# Patient Record
Sex: FEMALE | Race: WHITE | Employment: FULL TIME | URBAN - METROPOLITAN AREA
[De-identification: names, ages, dates, MRNs, and addresses within clinical notes are randomized per-mention and may not be internally consistent; named-entity substitution may affect disease eponyms.]

---

## 2019-06-27 PROCEDURE — 82607 VITAMIN B-12: CPT | Performed by: FAMILY MEDICINE

## 2019-10-09 PROCEDURE — 83883 ASSAY NEPHELOMETRY NOT SPEC: CPT | Performed by: OTHER

## 2019-10-09 PROCEDURE — 86618 LYME DISEASE ANTIBODY: CPT | Performed by: OTHER

## 2019-10-09 PROCEDURE — 86334 IMMUNOFIX E-PHORESIS SERUM: CPT | Performed by: OTHER

## 2019-10-09 PROCEDURE — 86617 LYME DISEASE ANTIBODY: CPT | Performed by: OTHER

## 2019-10-09 PROCEDURE — 84165 PROTEIN E-PHORESIS SERUM: CPT | Performed by: OTHER

## 2019-10-09 PROCEDURE — 82164 ANGIOTENSIN I ENZYME TEST: CPT | Performed by: OTHER

## 2020-01-29 PROCEDURE — 88173 CYTOPATH EVAL FNA REPORT: CPT | Performed by: SURGERY

## 2020-06-08 PROBLEM — E78.5 DYSLIPIDEMIA: Status: ACTIVE | Noted: 2020-06-08

## 2025-05-14 RX ORDER — HYDROCHLOROTHIAZIDE 25 MG/1
25 TABLET ORAL DAILY
COMMUNITY
Start: 2025-02-13

## 2025-05-14 RX ORDER — LABETALOL 100 MG/1
150 TABLET, FILM COATED ORAL 2 TIMES DAILY
COMMUNITY
Start: 2022-12-12

## 2025-05-16 ENCOUNTER — HOSPITAL ENCOUNTER (OUTPATIENT)
Facility: HOSPITAL | Age: 70
Setting detail: HOSPITAL OUTPATIENT SURGERY
Discharge: HOME | End: 2025-05-16
Attending: ORTHOPAEDIC SURGERY | Admitting: ORTHOPAEDIC SURGERY
Payer: COMMERCIAL

## 2025-05-16 ENCOUNTER — ANESTHESIA (OUTPATIENT)
Dept: SURGERY | Facility: HOSPITAL | Age: 70
End: 2025-05-16
Payer: COMMERCIAL

## 2025-05-16 ENCOUNTER — ANESTHESIA EVENT (OUTPATIENT)
Dept: SURGERY | Facility: HOSPITAL | Age: 70
End: 2025-05-16
Payer: COMMERCIAL

## 2025-05-16 VITALS
BODY MASS INDEX: 24.1 KG/M2 | HEIGHT: 63 IN | DIASTOLIC BLOOD PRESSURE: 67 MMHG | OXYGEN SATURATION: 96 % | WEIGHT: 136 LBS | HEART RATE: 66 BPM | RESPIRATION RATE: 16 BRPM | SYSTOLIC BLOOD PRESSURE: 132 MMHG | TEMPERATURE: 98 F

## 2025-05-16 PROCEDURE — 88311 DECALCIFY TISSUE: CPT | Performed by: ORTHOPAEDIC SURGERY

## 2025-05-16 PROCEDURE — 88305 TISSUE EXAM BY PATHOLOGIST: CPT | Performed by: ORTHOPAEDIC SURGERY

## 2025-05-16 DEVICE — UNIVERS REVERS SUTURE CUP, 33 (+2 RIGHT)
Type: IMPLANTABLE DEVICE | Site: SHOULDER | Status: FUNCTIONAL
Brand: ARTHREX®

## 2025-05-16 DEVICE — IMPLANTABLE DEVICE: Type: IMPLANTABLE DEVICE | Site: SHOULDER | Status: FUNCTIONAL

## 2025-05-16 DEVICE — 33 +4 LAT/24 GLENOSPHERE
Type: IMPLANTABLE DEVICE | Site: SHOULDER | Status: FUNCTIONAL
Brand: ARTHREX®

## 2025-05-16 DEVICE — UNIVERS REVERS HUMERAL STEM, SIZE 7
Type: IMPLANTABLE DEVICE | Site: SHOULDER | Status: FUNCTIONAL
Brand: ARTHREX®

## 2025-05-16 DEVICE — IMPLANTABLE DEVICE: Type: IMPLANTABLE DEVICE | Site: SHOULDER

## 2025-05-16 DEVICE — HUMERAL INSERT XS 33+3MM
Type: IMPLANTABLE DEVICE | Site: SHOULDER | Status: FUNCTIONAL
Brand: ARTHREX®

## 2025-05-16 RX ORDER — HYDROMORPHONE HYDROCHLORIDE 1 MG/ML
0.6 INJECTION, SOLUTION INTRAMUSCULAR; INTRAVENOUS; SUBCUTANEOUS EVERY 5 MIN PRN
Status: DISCONTINUED | OUTPATIENT
Start: 2025-05-16 | End: 2025-05-16

## 2025-05-16 RX ORDER — ROPIVACAINE HYDROCHLORIDE 5 MG/ML
INJECTION, SOLUTION EPIDURAL; INFILTRATION; PERINEURAL AS NEEDED
Status: DISCONTINUED | OUTPATIENT
Start: 2025-05-16 | End: 2025-05-16 | Stop reason: SURG

## 2025-05-16 RX ORDER — NALOXONE HYDROCHLORIDE 0.4 MG/ML
0.08 INJECTION, SOLUTION INTRAMUSCULAR; INTRAVENOUS; SUBCUTANEOUS AS NEEDED
Status: DISCONTINUED | OUTPATIENT
Start: 2025-05-16 | End: 2025-05-16

## 2025-05-16 RX ORDER — ONDANSETRON 2 MG/ML
INJECTION INTRAMUSCULAR; INTRAVENOUS AS NEEDED
Status: DISCONTINUED | OUTPATIENT
Start: 2025-05-16 | End: 2025-05-16 | Stop reason: SURG

## 2025-05-16 RX ORDER — VANCOMYCIN HYDROCHLORIDE 1 G/20ML
INJECTION, POWDER, LYOPHILIZED, FOR SOLUTION INTRAVENOUS AS NEEDED
Status: DISCONTINUED | OUTPATIENT
Start: 2025-05-16 | End: 2025-05-16 | Stop reason: HOSPADM

## 2025-05-16 RX ORDER — ACETAMINOPHEN 500 MG
1000 TABLET ORAL ONCE
Status: COMPLETED | OUTPATIENT
Start: 2025-05-16 | End: 2025-05-16

## 2025-05-16 RX ORDER — METOCLOPRAMIDE HYDROCHLORIDE 5 MG/ML
10 INJECTION INTRAMUSCULAR; INTRAVENOUS EVERY 8 HOURS PRN
Status: DISCONTINUED | OUTPATIENT
Start: 2025-05-16 | End: 2025-05-16

## 2025-05-16 RX ORDER — METOPROLOL TARTRATE 25 MG/1
25 TABLET, FILM COATED ORAL ONCE AS NEEDED
Status: DISCONTINUED | OUTPATIENT
Start: 2025-05-16 | End: 2025-05-16 | Stop reason: HOSPADM

## 2025-05-16 RX ORDER — SODIUM CHLORIDE, SODIUM LACTATE, POTASSIUM CHLORIDE, CALCIUM CHLORIDE 600; 310; 30; 20 MG/100ML; MG/100ML; MG/100ML; MG/100ML
INJECTION, SOLUTION INTRAVENOUS CONTINUOUS
Status: DISCONTINUED | OUTPATIENT
Start: 2025-05-16 | End: 2025-05-16

## 2025-05-16 RX ORDER — ONDANSETRON 2 MG/ML
4 INJECTION INTRAMUSCULAR; INTRAVENOUS EVERY 6 HOURS PRN
Status: DISCONTINUED | OUTPATIENT
Start: 2025-05-16 | End: 2025-05-16

## 2025-05-16 RX ORDER — HYDROMORPHONE HYDROCHLORIDE 1 MG/ML
0.2 INJECTION, SOLUTION INTRAMUSCULAR; INTRAVENOUS; SUBCUTANEOUS EVERY 5 MIN PRN
Status: DISCONTINUED | OUTPATIENT
Start: 2025-05-16 | End: 2025-05-16

## 2025-05-16 RX ORDER — ROCURONIUM BROMIDE 10 MG/ML
INJECTION, SOLUTION INTRAVENOUS AS NEEDED
Status: DISCONTINUED | OUTPATIENT
Start: 2025-05-16 | End: 2025-05-16 | Stop reason: SURG

## 2025-05-16 RX ORDER — LIDOCAINE HYDROCHLORIDE 10 MG/ML
INJECTION, SOLUTION EPIDURAL; INFILTRATION; INTRACAUDAL; PERINEURAL AS NEEDED
Status: DISCONTINUED | OUTPATIENT
Start: 2025-05-16 | End: 2025-05-16 | Stop reason: SURG

## 2025-05-16 RX ORDER — MORPHINE SULFATE 4 MG/ML
2 INJECTION, SOLUTION INTRAMUSCULAR; INTRAVENOUS EVERY 10 MIN PRN
Status: DISCONTINUED | OUTPATIENT
Start: 2025-05-16 | End: 2025-05-16

## 2025-05-16 RX ORDER — DEXAMETHASONE SODIUM PHOSPHATE 4 MG/ML
VIAL (ML) INJECTION AS NEEDED
Status: DISCONTINUED | OUTPATIENT
Start: 2025-05-16 | End: 2025-05-16 | Stop reason: SURG

## 2025-05-16 RX ORDER — MIDAZOLAM HYDROCHLORIDE 1 MG/ML
INJECTION INTRAMUSCULAR; INTRAVENOUS AS NEEDED
Status: DISCONTINUED | OUTPATIENT
Start: 2025-05-16 | End: 2025-05-16 | Stop reason: SURG

## 2025-05-16 RX ORDER — DEXAMETHASONE SODIUM PHOSPHATE 10 MG/ML
INJECTION, SOLUTION INTRAMUSCULAR; INTRAVENOUS AS NEEDED
Status: DISCONTINUED | OUTPATIENT
Start: 2025-05-16 | End: 2025-05-16 | Stop reason: SURG

## 2025-05-16 RX ORDER — MORPHINE SULFATE 4 MG/ML
4 INJECTION, SOLUTION INTRAMUSCULAR; INTRAVENOUS EVERY 10 MIN PRN
Status: DISCONTINUED | OUTPATIENT
Start: 2025-05-16 | End: 2025-05-16

## 2025-05-16 RX ORDER — HYDROMORPHONE HYDROCHLORIDE 1 MG/ML
0.4 INJECTION, SOLUTION INTRAMUSCULAR; INTRAVENOUS; SUBCUTANEOUS EVERY 5 MIN PRN
Status: DISCONTINUED | OUTPATIENT
Start: 2025-05-16 | End: 2025-05-16

## 2025-05-16 RX ORDER — MORPHINE SULFATE 10 MG/ML
6 INJECTION, SOLUTION INTRAMUSCULAR; INTRAVENOUS EVERY 10 MIN PRN
Status: DISCONTINUED | OUTPATIENT
Start: 2025-05-16 | End: 2025-05-16

## 2025-05-16 RX ORDER — TRANEXAMIC ACID 10 MG/ML
INJECTION, SOLUTION INTRAVENOUS AS NEEDED
Status: DISCONTINUED | OUTPATIENT
Start: 2025-05-16 | End: 2025-05-16 | Stop reason: SURG

## 2025-05-16 RX ORDER — PHENYLEPHRINE HCL 10 MG/ML
VIAL (ML) INJECTION AS NEEDED
Status: DISCONTINUED | OUTPATIENT
Start: 2025-05-16 | End: 2025-05-16 | Stop reason: SURG

## 2025-05-16 RX ORDER — LABETALOL HYDROCHLORIDE 5 MG/ML
5 INJECTION, SOLUTION INTRAVENOUS EVERY 5 MIN PRN
Status: DISCONTINUED | OUTPATIENT
Start: 2025-05-16 | End: 2025-05-16

## 2025-05-16 RX ADMIN — PHENYLEPHRINE HCL 100 MCG: 10 MG/ML VIAL (ML) INJECTION at 11:42:00

## 2025-05-16 RX ADMIN — ROPIVACAINE HYDROCHLORIDE 30 ML: 5 INJECTION, SOLUTION EPIDURAL; INFILTRATION; PERINEURAL at 10:12:00

## 2025-05-16 RX ADMIN — MIDAZOLAM HYDROCHLORIDE 2 MG: 1 INJECTION INTRAMUSCULAR; INTRAVENOUS at 10:10:00

## 2025-05-16 RX ADMIN — LIDOCAINE HYDROCHLORIDE 2 ML: 10 INJECTION, SOLUTION EPIDURAL; INFILTRATION; INTRACAUDAL; PERINEURAL at 10:10:00

## 2025-05-16 RX ADMIN — DEXAMETHASONE SODIUM PHOSPHATE 4 MG: 10 INJECTION, SOLUTION INTRAMUSCULAR; INTRAVENOUS at 10:12:00

## 2025-05-16 RX ADMIN — SODIUM CHLORIDE, SODIUM LACTATE, POTASSIUM CHLORIDE, CALCIUM CHLORIDE: 600; 310; 30; 20 INJECTION, SOLUTION INTRAVENOUS at 11:35:00

## 2025-05-16 RX ADMIN — LIDOCAINE HYDROCHLORIDE 30 MG: 10 INJECTION, SOLUTION EPIDURAL; INFILTRATION; INTRACAUDAL; PERINEURAL at 10:54:00

## 2025-05-16 RX ADMIN — SODIUM CHLORIDE, SODIUM LACTATE, POTASSIUM CHLORIDE, CALCIUM CHLORIDE: 600; 310; 30; 20 INJECTION, SOLUTION INTRAVENOUS at 13:19:00

## 2025-05-16 RX ADMIN — TRANEXAMIC ACID 1000 MG: 10 INJECTION, SOLUTION INTRAVENOUS at 11:20:00

## 2025-05-16 RX ADMIN — PHENYLEPHRINE HCL 100 MCG: 10 MG/ML VIAL (ML) INJECTION at 12:03:00

## 2025-05-16 RX ADMIN — ROCURONIUM BROMIDE 50 MG: 10 INJECTION, SOLUTION INTRAVENOUS at 11:00:00

## 2025-05-16 RX ADMIN — DEXAMETHASONE SODIUM PHOSPHATE 8 MG: 4 MG/ML VIAL (ML) INJECTION at 11:10:00

## 2025-05-16 RX ADMIN — ONDANSETRON 4 MG: 2 INJECTION INTRAMUSCULAR; INTRAVENOUS at 11:10:00

## 2025-05-16 NOTE — ANESTHESIA POSTPROCEDURE EVALUATION
Patient: Mariana Roy    Procedure Summary       Date: 05/16/25 Room / Location: [facility] MAIN OR 02 / [facility] MAIN OR    Anesthesia Start: 1046 Anesthesia Stop: 1320    Procedure: Right reverse total shoulder arthroplasty and open biceps tenodesis (Right: Shoulder) Diagnosis: (Traumatic complete tear right rotator cuff, glenohumeral arthritis, right, tendinitis right upper extremity)    Surgeons: Damon Roy MD Anesthesiologist: Philippe Ruiz MD    Anesthesia Type: general ASA Status: 2            Anesthesia Type: general    Vitals Value Taken Time   /63 05/16/25 13:17   Temp 97 F 05/16/25 13:20   Pulse 66 05/16/25 13:19   Resp 11 05/16/25 13:19   SpO2 98 % 05/16/25 13:19   Vitals shown include unfiled device data.    [facility] AN Post Evaluation:   Patient Evaluated in PACU  Patient Participation: complete - patient participated  Level of Consciousness: awake, awake and alert and responsive to verbal stimuli  Pain Score: 0  Pain Management: adequate  Airway Patency:patent  Yes    Nausea/Vomiting: none  Cardiovascular Status: acceptable, stable and hemodynamically stable  Respiratory Status: spontaneous ventilation, nonlabored ventilation and face mask  Postoperative Hydration acceptable      Isaiah Tipton, CRNA  5/16/2025 1:20 PM

## 2025-05-16 NOTE — ANESTHESIA PROCEDURE NOTES
Peripheral Block    Date/Time: 5/16/2025 10:10 AM    Performed by: Philippe Ruiz MD  Authorized by: Philippe Ruiz MD      General Information and Staff    Start Time:  5/16/2025 10:10 AM  End Time:  5/16/2025 10:12 AM  Anesthesiologist:  Philippe Ruiz MD  Performed by:  Anesthesiologist  Patient Location:  PACU    Block Placement: Pre Induction  Site Identification: real time ultrasound guided, nerve stimulator and image stored and retrievable    Block site/laterality marked before start: site marked  Reason for Block: at surgeon's request and post-op pain management    Preanesthetic Checklist: 2 patient identifers, IV checked, site marked, risks and benefits discussed, monitors and equipment checked, pre-op evaluation, timeout performed, anesthesia consent, sterile technique used, no prohibitive neurological deficits and no local skin infection at insertion site      Procedure Details    Patient Position:  Sitting  Prep: ChloraPrep and patient draped    Monitoring:  Cardiac monitor, blood pressure cuff, heart rate and continuous pulse ox  Block Type:  Interscalene  Laterality:  Right  Injection Technique:  Single-shot    Needle    Needle Type:  Short-bevel  Needle Gauge:  22 G  Needle Length:  50 mm  Needle Localization:  Ultrasound guidance and nerve stimulator  Reason for Ultrasound Use: appropriate spread of the medication was noted in real time and no ultrasound evidence of intravascular and/or intraneural injection    Nerve Stimulator: 0.4 amps  Muscle Twitch Response: flexor carpi radialis response      Assessment    Injection Assessment:  Good spread noted, incremental injection, local visualized surrounding nerve on ultrasound, low pressure, negative aspiration for heme, no pain on injection and negative resistance  Paresthesia Pain:  None  Heart Rate Change: No    - Patient tolerated block procedure well without evidence of immediate block related complications.      Medications  5/16/2025 10:10 AM      Additional Comments

## 2025-05-16 NOTE — DISCHARGE INSTRUCTIONS
Damon Roy MD      Total Shoulder Arthroplasty    THE OPERATION:  Your operation was done through an incision in front of your shoulder.  Your shoulder was replaced with metal and plastic.  The rotator cuff tendon in the front of your shoulder was cut in order to do the surgery and then repaired.    PAIN:  You will be given a prescription for pain medicine.  Have this filled at your local pharmacy or where your insurance plan has made arrangements.  The pills may be taken every four hours for pain if needed.  Do not drive while you are taking the pain medication.    ACTIVITY:  You should rest your shoulder in the sling for the first two weeks.  You may bend and straighten your fingers, wrist, and elbow as much as you desire.  Do not raise your arm up or away from your body on your own.  It is safe to write and eat with your operated arm as long as there is no pain.  Do not carry anything heavier than a dinner fork or a pencil with your operated arm.  Do not use your arm to help you get out of a bed or chair.    SLING:  A sling will help to support the arm and to remind you not to move your arm away from your body.  Wear the sling at night and always wear it out in public.  When you are home and seated, you may remove the sling and support your arm on a pillow.    ICE:  Apply ice to your shoulder for 1 hour, 4 times a day for 3 days after surgery.  This will help reduce swelling and pain.  After that you may apply ice as much as you like.    WOUNDS:  Your surgery was done through an incision in front of your shoulder.  The stitches will absorb and will not need to be removed.  You may remove the large bandage 5 days after surgery.  The incision may be sore, swell, and develop bruising over the next several days.  This will go away and no special care is needed.      BATHING:  It is safe to take a shower 5 days after surgery after you remove the large bandage.  To bathe, remove the sling and let your arm hang by your  side.  To wash under your armpit, lean over and let the arm fall away from your body.  DO NOT raise your arm!  You may gently wash the incision with regular soap and water.  Do not scrub.  Your hand and forearm skin may be dry and peel due to the strong disinfectant soap we use at the time of surgery.    FOLLOW-UP:  Call the office to make an appointment to see me in about 2 weeks after your surgery.  If your have a temperature over 101ºF, severe pain, or redness in your shoulder; please contact the office.  You may be asked to come back to the office early.    HOME INSTRUCTIONS  AMBSURG HOME CARE INSTRUCTIONS: POST-OP ANESTHESIA  The medication that you received for sedation or general anesthesia can last up to 24 hours. Your judgment and reflexes may be altered, even if you feel like your normal self.      We Recommend:   Do not drive any motor vehicle or bicycle   Avoid mowing the lawn, playing sports, or working with power tools/applicances (power saws, electric knives or mixers)   That you have someone stay with you on your first night home   Do not drink alcohol or take sleeping pills or tranquilizers   Do not sign legal documents within 24 hours of your procedure   If you had a nerve block for your surgery, take extra care not to put any pressure on your arm or hand for 24 hours    It is normal:  For you to have a sore throat if you had a breathing tube during surgery (while you were asleep!). The sore throat should get better within 48 hours. You can gargle with warm salt water (1/2 tsp in 4 oz warm water) or use a throat lozenge for comfort  To feel muscle aches or soreness especially in the abdomen, chest or neck. The achy feeling should go away in the next 24 hours  To feel weak, sleepy or \"wiped out\". Your should start feeling better in the next 24 hours.   To experience mild discomforts such as sore lip or tongue, headache, cramps, gas pains or a bloated feeling in your abdomen.   To experience mild  back pain or soreness for a day or two if you had spinal or epidural anesthesia.   If you had laparoscopic surgery, to feel shoulder pain or discomfort on the day of surgery.   For some patients to have nausea after surgery/anesthesia    If you feel nausea or experience vomiting:   Try to move around less.   Eat less than usual or drink only liquids until the next morning   Nausea should resolve in about 24 hours    If you have a problem when you are at home:    Call your surgeons office   Discharge Instructions: After Your Surgery  You’ve just had surgery. During surgery, you were given medicine called anesthesia to keep you relaxed and free of pain. After surgery, you may have some pain or nausea. This is common. Here are some tips for feeling better and getting well after surgery.   Going home  Your healthcare provider will show you how to take care of yourself when you go home. They'll also answer your questions. Have an adult family member or friend drive you home. For the first 24 hours after your surgery:   Don't drive or use heavy equipment.  Don't make important decisions or sign legal papers.  Take medicines as directed.  Don't drink alcohol.  Have someone stay with you, if needed. They can watch for problems and help keep you safe.  Be sure to go to all follow-up visits with your healthcare provider. And rest after your surgery for as long as your provider tells you to.   Coping with pain  If you have pain after surgery, pain medicine will help you feel better. Take it as directed, before pain becomes severe. Also, ask your healthcare provider or pharmacist about other ways to control pain. This might be with heat, ice, or relaxation. And follow any other instructions your surgeon or nurse gives you.      Stay on schedule with your medicine.     Tips for taking pain medicine  To get the best relief possible, remember these points:   Pain medicines can upset your stomach. Taking them with a little food may  help.  Most pain relievers taken by mouth need at least 20 to 30 minutes to start to work.  Don't wait till your pain becomes severe before you take your medicine. Try to time your medicine so that you can take it before starting an activity. This might be before you get dressed, go for a walk, or sit down for dinner.  Constipation is a common side effect of some pain medicines. Call your healthcare provider before taking any medicines such as laxatives or stool softeners to help ease constipation. Also ask if you should skip any foods. Drinking lots of fluids and eating foods such as fruits and vegetables that are high in fiber can also help. Remember, don't take laxatives unless your surgeon has prescribed them.  Drinking alcohol and taking pain medicine can cause dizziness and slow your breathing. It can even be deadly. Don't drink alcohol while taking pain medicine.  Pain medicine can make you react more slowly to things. Don't drive or run machinery while taking pain medicine.  Your healthcare provider may tell you to take acetaminophen to help ease your pain. Ask them how much you're supposed to take each day. Acetaminophen or other pain relievers may interact with your prescription medicines or other over-the-counter (OTC) medicines. Some prescription medicines have acetaminophen and other ingredients in them. Using both prescription and OTC acetaminophen for pain can cause you to accidentally overdose. Read the labels on your OTC medicines with care. This will help you to clearly know the list of ingredients, how much to take, and any warnings. It may also help you not take too much acetaminophen. If you have questions or don't understand the information, ask your pharmacist or healthcare provider to explain it to you before you take the OTC medicine.   Managing nausea  Some people have an upset stomach (nausea) after surgery. This is often because of anesthesia, pain, or pain medicine, less movement of food  in the stomach, or the stress of surgery. These tips will help you handle nausea and eat healthy foods as you get better. If you were on a special food plan before surgery, ask your healthcare provider if you should follow it while you get better. Check with your provider on how your eating should progress. It may depend on the surgery you had. These general tips may help:   Don't push yourself to eat. Your body will tell you when to eat and how much.  Start off with clear liquids and soup. They're easier to digest.  Next try semi-solid foods as you feel ready. These include mashed potatoes, applesauce, and gelatin.  Slowly move to solid foods. Don’t eat fatty, rich, or spicy foods at first.  Don't force yourself to have 3 large meals a day. Instead eat smaller amounts more often.  Take pain medicines with a small amount of solid food, such as crackers or toast. This helps prevent nausea.  When to call your healthcare provider  Call your healthcare provider right away if you have any of these:   You still have too much pain, or the pain gets worse, after taking the medicine. The medicine may not be strong enough. Or there may be a complication from the surgery.  You feel too sleepy, dizzy, or groggy. The medicine may be too strong.  Side effects such as nausea or vomiting. Your healthcare provider may advise taking other medicines to .  Skin changes such as rash, itching, or hives. This may mean you have an allergic reaction. Your provider may advise taking other medicines.  The incision looks different (for instance, part of it opens up).  Bleeding or fluid leaking from the incision site, and weren't told to expect that.  Fever of 100.4°F (38°C) or higher, or as directed by your provider.  Call 911  Call 911 right away if you have:   Trouble breathing  Facial swelling    If you have obstructive sleep apnea   You were given anesthesia medicine during surgery to keep you comfortable and free of pain. After surgery,  you may have more apnea spells because of this medicine and other medicines you were given. The spells may last longer than normal.    At home:  Keep using the continuous positive airway pressure (CPAP) device when you sleep. Unless your healthcare provider tells you not to, use it when you sleep, day or night. CPAP is a common device used to treat obstructive sleep apnea.  Talk with your provider before taking any pain medicine, muscle relaxants, or sedatives. Your provider will tell you about the possible dangers of taking these medicines.  Contact your provider if your sleeping changes a lot even when taking medicines as directed.  [employer] last reviewed this educational content on 10/1/2021  © 5749-6522 [employer]. All rights reserved. This information is not intended as a substitute for professional medical care. Always follow your healthcare professional's instructions.

## 2025-05-16 NOTE — ANESTHESIA PREPROCEDURE EVALUATION
Anesthesia PreOp Note    HPI:     Mariana Roy is a 69 year old female who presents for preoperative consultation requested by: Damon Roy MD    Date of Surgery: 5/16/2025    Procedure(s):  Right reverse total shoulder arthroplasty and open biceps tenodesis  Indication: Traumatic complete tear right rotator cuff, glenohumeral arthritis, right, tendinitis right upper extremity    Relevant Problems   No relevant active problems       NPO:  Last Liquid Consumption Date: 05/16/25  Last Liquid Consumption Time: 0500  Last Solid Consumption Date: 05/15/25  Last Solid Consumption Time: 2030  Last Liquid Consumption Date: 05/16/25          History Review:  Patient Active Problem List    Diagnosis Date Noted    Dyslipidemia 06/08/2020    Essential hypertension, benign 09/10/2007    FAMILY HX-ISCHEM HEART DIS (Mom MI @ 48) 09/10/2007    Other and unspecified hyperlipidemia 09/10/2007    Disorder of bone and cartilage 09/10/2007    Hypothyroidism 09/10/2007       Past Medical History[1]    Past Surgical History[2]    Prescriptions Prior to Admission[3]  Current Medications and Prescriptions Ordered in Epic[4]    Allergies[5]    Family History[6]  Social Hx on file[7]    Available pre-op labs reviewed.             Vital Signs:  Body mass index is 24.09 kg/m².   height is 1.6 m (5' 3\") and weight is 61.7 kg (136 lb).   Vitals:    05/14/25 1429   Weight: 61.7 kg (136 lb)   Height: 1.6 m (5' 3\")        Anesthesia Evaluation     Patient summary reviewed and Nursing notes reviewed    No history of anesthetic complications   Airway   Mallampati: II  TM distance: >3 FB  Neck ROM: full  Dental - Dentition appears grossly intact     Pulmonary - negative ROS and normal exam   Cardiovascular - normal exam  Exercise tolerance: good  (+) hypertension well controlled    ROS comment: hyperlipidemia    Neuro/Psych - negative ROS     GI/Hepatic/Renal - negative ROS     Endo/Other    (+) hypothyroidism  Abdominal  - normal exam                  Anesthesia Plan:   ASA:  2  Plan:   General  Airway:  ETT  Post-op Pain Management: IV analgesics, Oral pain medication and Interscalene block  Informed Consent Plan and Risks Discussed With:  Patient  Discussed plan with:  CRNA      I have informed Mariana Roy of the nature of the anesthetic plan, benefits, risks including possible dental damage if relevant, major complications, and any alternative forms of anesthetic management.   All of the patient's questions were answered to the best of my ability. The patient desires the anesthetic management as planned.  I have informed Mariana Roy  of the risks of regional anesthesia including, but not limited to: failure, toxicity, cardiac arrest, infection, bleeding, and  nerve damage. The patient desires the proposed regional anesthetic as planned.   JEAN-PAUL MORGAN MD  5/16/2025 9:37 AM  Present on Admission:  **None**           [1]   Past Medical History:   Disorder of bone and cartilage, unspecified    took fosamax x's >5y    Disorder of thyroid    High blood pressure    High cholesterol    Osteoarthritis    Other and unspecified hyperlipidemia    Unspecified essential hypertension    Unspecified hypothyroidism    Visual impairment    contacts   [2]   Past Surgical History:  Procedure Laterality Date    Colonoscopy      8/08- normal    Colonoscopy  06/2019    Ligate fallopian tube      1985    Obstetrical care,vag deliv only      x2    Other surgical history      stress test 8/08 - normal (screening)    Special service or report      2007-right 4th finger surgery    Special service or report      bilateral carpal tunnel release    Special service or report      L ulnar nerve release    Total knee replacement Left    [3]   Medications Prior to Admission   Medication Sig Dispense Refill Last Dose/Taking    labetalol 100 MG Oral Tab Take 1.5 tablets (150 mg total) by mouth 2 (two) times daily.   5/16/2025 Morning    hydroCHLOROthiazide 25 MG Oral Tab Take 1  tablet (25 mg total) by mouth daily.   5/15/2025 Morning    AMLODIPINE 5 MG Oral Tab TAKE 1 TABLET BY MOUTH  DAILY 90 tablet 1 5/16/2025 Morning    lisinopril 40 MG Oral Tab Take 1 tablet (40 mg total) by mouth once daily. (Patient taking differently: Take 0.5 tablets (20 mg total) by mouth daily.) 90 tablet 0 5/15/2025 Morning    ATORVASTATIN 10 MG Oral Tab TAKE 1 TABLET BY MOUTH  DAILY (Patient taking differently: Take 2 tablets (20 mg total) by mouth nightly.) 90 tablet 3 5/15/2025 Bedtime    fexofenadine 180 MG Oral Tab Take 1 tablet (180 mg total) by mouth in the morning.   5/16/2025 Morning    gabapentin 100 MG Oral Cap Take 1 capsule (100 mg total) by mouth daily as needed (restless leg symptoms). (Patient taking differently: Take 1 capsule (100 mg total) by mouth See Admin Instructions. 200 mg nightly/   100 mg during the day as needed) 30 capsule 5 5/15/2025 Bedtime    LEVOTHYROXINE 137 MCG Oral Tab TAKE 1 TABLET BY MOUTH  DAILY (Patient taking differently: Take 125 mcg by mouth before breakfast.) 90 tablet 3 5/16/2025 Morning    Multiple Vitamins-Calcium (ONE-A-DAY WOMENS FORMULA) Oral Tab Take by mouth.  0 Past Week    Cholecalciferol (VITAMIN D) 50 MCG (2000 UT) Oral Cap Take by mouth. 30 capsule 0 Past Week    Magnesium 400 MG Oral Cap Take by mouth.  0 Past Week    triamcinolone acetonide 0.1 % External Cream Apply to AA on trunk and extremities bid prn 80 g 1    [4]   Current Facility-Administered Medications Ordered in Epic   Medication Dose Route Frequency Provider Last Rate Last Admin    lactated ringers infusion   Intravenous Continuous Roy, MD Damon        metoprolol tartrate (Lopressor) tab 25 mg  25 mg Oral Once PRN Roy, MD Damon        ceFAZolin (Ancef) 2g in 10mL IV syringe premix  2 g Intravenous Once Roy, MD Damon         No current [facility]-ordered outpatient medications on file.   [5] No Known Allergies  [6]   Family History  Problem Relation Age of Onset    Hypertension Mother      Heart Disorder Mother          at 48 of massive MI    Cancer Maternal Grandmother         breast ca    Breast Cancer Maternal Grandmother 49        AGE 49    Hypertension Maternal Grandfather     Hypertension Brother     Hypertension Brother     Diabetes Neg    [7]   Social History  Socioeconomic History    Marital status:    Tobacco Use    Smoking status: Never    Smokeless tobacco: Never   Vaping Use    Vaping status: Never Used   Substance and Sexual Activity    Alcohol use: Yes     Alcohol/week: 0.0 standard drinks of alcohol     Comment: 2 dk/mo    Drug use: No

## 2025-05-16 NOTE — OPERATIVE REPORT
Operative Note    Date: 05/16/25    Surgeon: Damon Roy MD    Assistants: José Britton  Assistant necessary for patient positioning, retraction, holding arm, implanting implant, and closure.    Preop Diagnosis:  Right Shoulder  1. Cuff Tear Arthropathy  2. Biceps Tendinitis    Postop Diagnosis:  Same    Procedures:   Right Shoulder  1. Reverse Total Shoulder Arthroplasty (54443)  2. Biceps Tenodesis (32346)    Anesthesiologist:   CRNA: Isaiah Tipton CRNA  Anesthesiologist [facility]: Philippe Ruiz MD    Anesthesia:  Interscalene Block and General    EBL:  30 cc    Implants:  Implant Name Type Inv. Item Serial No.  Lot No. LRB No. Used Action   POST BILL FIX 20MM MOD FOR UNIVERS REVERS - SN/A  POST BILL FIX 20MM MOD FOR UNIVERS REVERS N/A [employer]  N/A Right 1 Implanted   BASEPLATE BILL AUGMENTED RTSA MGS 24MM 10 DEG - SN/A  BASEPLATE BILL AUGMENTED RTSA MGS 24MM 10 DEG N/A [employer]  N/A Right 1 Implanted   SCREW BNE L28MM DIA5.5MM PERIPH MALATHI FOR MOD - SN/A  SCREW BNE L28MM DIA5.5MM PERIPH MALATHI FOR MOD N/A [employer]  [id number] Right 1 Implanted   SCREW BNE 4.5X32MM PERIPH NONLOCKING MOD BILL - SN/A  SCREW BNE 4.5X32MM PERIPH NONLOCKING MOD BILL N/A [employer]  [id number] Right 1 Implanted   SCREW BNE 5.5X16MM PERIPH MALATHI MOD BILL SYS - SN/A  SCREW BNE 5.5X16MM PERIPH MALATHI MOD BILL SYS N/A [employer]  [id number] Right 1 Implanted   SCREW BNE 5.5X16MM PERIPH MALATHI MOD BILL SYS - SN/A  SCREW BNE 5.5X16MM PERIPH MALATHI MOD BILL SYS N/A [employer]  [id number] Right 1 Implanted   CUP SHLDR 33MM +2MM R SUT UNIVERS REVERS - SN/A  CUP SHLDR 33MM +2MM R SUT UNIVERS REVERS N/A [employer]  [id number] Right 1 Implanted   LINER HUM XSM 33MM +3MM - SN/A  LINER HUM XSM 33MM +3MM N/A [employer]  [id number] Right 1 Implanted   SPHERE BILL SZ 33 +4 LAT 24MM BASEPLT TAPR - SN/A  SPHERE BILL SZ 33 +4 LAT 24MM BASEPLT TAPR N/A [employer]  [id number] Right 1 Implanted   STEM HUM SZ 7 RECTANG PROX FILL - SN/A   STEM HUM SZ 7 RECTANG PROX FILL N/A [employer] WD [id number] Right 1 Implanted         Specimens:  ID Type Source Tests Collected by Time Destination   1 : 1. humeral head and bicep. Tissue Humeral head SURGICAL PATHOLOGY TISSUE RoyDamon MD 5/16/2025 3833        Complications:  * No complications entered in OR log *    Condition:  Stable, transferred to recovery room      OPERATIVE REPORT:     Subscapularis:  Peel without repair  Rotator Cuff: Large full thickness tear  Glenoid: Walch A1  Biceps: tenodesis to pectoralis major tendon       PROCEDURE:   On the day of surgery, the Mariana was seen in the preoperative area.  I confirmed her identity by name and birthday. We reviewed and confirmed the surgical consent including laterality.  The shoulder was marked with my initials.  We re-reviewed the risks and benefits of the procedure and I answered all additional questions to her satisfaction.      Mariana was taken to the operating room in stable condition.  After induction of anesthesia, she was placed in a modified beach chair position  and all bony-prominences well padded. The upper extremity was then prepped and draped in a standard, sterile fashion.  A mechanical arm palomares was used intermittently throughout the case.     A surgical time out was performed where I confirmed laterality as marked by my initials, reaffirmed the consent, noted appropriate imaging studies were in the room, and that preoperative antibiotics had been given within recommended timeframe prior to skin incision.      Relevant landmarks were identified and marked out on the skin.  A deltopectoral exposure extending from just proximal and lateral to the coracoid to the lateral insertion of the deltoid. A 10 blade scalpel was used to make a skin incision and dissection was carried down through subcutaneous tissue to the level of the deltopectoral interval. The cephalic vein was identified, retracted laterally with the deltoid and protected  throughout the case. The deltopectoral interval was exploited giving exposure of the clavicopectoral fascia below. Blunt finger dissection was used to free adhesions within the subacromial and subdeltoid bursa to ensure that a Brown retractor could easily be placed.  The coracoacromial ligament which was only partially released for exposure.      The long head of the biceps tendon was identified in the bicipital tunnel and its constraining sheath opened revealing synovitis and debris. The tendon was tenotomized and tenodesed to the proximal margin of the pectoralis major tendon with a #2 ethylbond in a locking figure of eight configuration.  The long head of the biceps tendon was sent as specimen. The clavicopectoral fascia was incised and a retractor was placed to facilitate exposure. The three sisters were identified and coagulated.      A subscapularis peel was performed from its insertion on the lesser tuberosity with the arm in adduction and external rotation, to protect the axillary nerve.  Inferior capsular release was performed along its humeral insertion and the humeral head was dislocated.  The humeral head was grossly osteoarthritic. A 45 degree neck-shaft angle cut was made in approximately 30 degrees of retroversion using an oscillating saw with appropriate retractors for soft tissue protection. Humeral head was sent as specimen. Any osteophytes were removed circumferentially. Humeral head protector was then placed.     Attention was then turned to the glenoid, which appeared grossly arthritic. Remnants of the biceps and labrum were circumferentially excised. Anterior and inferior capsular releases then took place using bovie electrocautery using finger as retraction inferiorly to protect the axillary nerve.  Retractors were placed to allow adequate exposure of the glenoid. A guide pin was placed using the guide and the corresponding reamer was used to create a flat surface leaving the \"bloody smile\"  inferior indicating adequate inferior tilt. The proper baseplate was then seated into its final position.  Screws were then placed circumfrentially.  The final humeral glenosphere was then impacted into position.    Access to the humeral canal was achieved with sequential broaching, the final broach was left in place. A posterior offset metaphyseal reaming guide followed by the metaphyseal reamer was used to complete preparation of the humerus.       Components were then trialed and the shoulder was reduced and taken through a range of motion demonstrating stability of the prosthesis and appropriate tension on soft tissue structures. Trial components were removed and the final humeral implant was then impacted into position.  The shoulder was then reduced.      The wound was irrigated. The subscapularis was able to be repaired without excessive tension with the shoulder in 30 degrees of external rotation.  This was accomplished by figure of 8 sutures between the tendon stump and residual rotator cuff.       The shoulder was again irrigated and attention was then turned to closure.  1 gram of Vancomycin powder was sprinkled into the surgical field.  The deltopectoral interval was closed with #1 Ethibond suture to bean the interval in the event that future operation is needed and completed with 0-Vicryl interrupted stitches. Closure was then completed in layers with 0-Vicryl, followed by 2-0 vicryl deep dermal sutures, and a running subcuticular monocryl and dermabond for skin. All needle and sponge counts were correct. Sterile dressings were placed and the arm was placed in a sling immobilizer.       Mariana tolerated the procedure well with no evident complication at the end of the procedure.  At the end of the procedure all counts were performed and correct.

## 2025-05-16 NOTE — ANESTHESIA PROCEDURE NOTES
Airway  Date/Time: 5/16/2025 10:56 AM  Reason: elective    Airway not difficult    General Information and Staff   Patient location during procedure: OR  Resident/CRNA: Isaiah Tipton CRNA  Performed: CRNA   Performed by: Isaiah Tipton CRNA  Authorized by: Philippe Ruiz MD        Indications and Patient Condition  Indications for airway management: anesthesia  Sedation level: deep      Preoxygenated: yesPatient position: sniffing    Mask difficulty assessment: 1 - vent by mask  Planned trial extubation    Final Airway Details    Final airway type: endotracheal airway    Successful airway: ETT  Cuffed: yes   Successful intubation technique: Video laryngoscopy  Facilitating devices/methods: intubating stylet  Endotracheal tube insertion site: oral  Blade type: Aldrich 3.0.  Blade size: #3  ETT size (mm): 7.0    Cormack-Lehane Classification: grade I - full view of glottis  Cuff volume (mL): 7  Measured from: lips  ETT to lips (cm): 21  Number of attempts at approach: 1  Number of other approaches attempted: 0       Statement Selected

## 2025-05-16 NOTE — H&P
ORTHO VISIT NOTE      Mariana Roy - MRN: [id number]    HISTORY OF PRESENT ILLNESS: The patient is a 69 year old year old female who presents today for MRI review of her right shoulder. Reports continued significant weakness as well as pain that is gotten somewhat better but still a 7 out of 10 intensity and interferes with ability perform her ADLs. Denies gross instability or dislocations of the shoulder. Reports continued inability to perform her ADLs due to the weakness.      Past Medical History:  Diagnosis Date  COVID 2022  Disorder of bone and cartilage, unspecified  took fosamax x's >5y  Disorder of thyroid  HYPO  Esophageal reflux  High blood pressure  High cholesterol  Other and unspecified hyperlipidemia  Unspecified essential hypertension  Unspecified hypothyroidism    Past Surgical History:  Procedure Laterality Date  ARTHRP KNE CONDYLE&PLATU MEDIAL&LAT COMPARTMENTS Left 08/2023  CARPAL TUNNEL RELEASE  COLONOSCOPY 08/2008  normal  COLONOSCOPY STOMA DX INCLUDING COLLJ SPEC SPX 06/2019  LIG/TRNSXJ FLP TUBE ABDL/VAG APPR UNI/BI  1985  OTHER SURGICAL HISTORY  stress test 8/08 - normal (screening)  UNLISTED SPECIAL SERVICE PROCEDURE/REPORT  2007-right 4th finger surgery  UNLISTED SPECIAL SERVICE PROCEDURE/REPORT  bilateral carpal tunnel release  UNLISTED SPECIAL SERVICE PROCEDURE/REPORT  L ulnar nerve release  VAGINAL DELIVERY ONLY  x2    Current Outpatient Medications:  HYDROCHLOROTHIAZIDE 25 MG Oral Tab, TAKE 1 TABLET BY MOUTH DAILY, Disp: 90 tablet, Rfl: 3  DICLOFENAC 75 MG Oral Tab EC, TAKE ONE TABLET BY MOUTH TWICE DAILY, Disp: 30 tablet, Rfl: 0  amLODIPine 5 MG Oral Tab, TAKE 1 TABLET BY MOUTH DAILY, Disp: 90 tablet, Rfl: 0  levothyroxine 137 MCG Oral Tab, TAKE 1 TABLET BY MOUTH DAILY, Disp: 90 tablet, Rfl: 0  labetalol 100 MG Oral Tab, TAKE 1 AND 1/2 TABLETS BY MOUTH TWICE DAILY, Disp: 270 tablet, Rfl: 0  ATORVASTATIN 20 MG Oral Tab, TAKE 1 TABLET BY MOUTH DAILY, Disp: 90 tablet, Rfl: 3  gabapentin  100 MG Oral Cap, TAKE 2 CAPSULES BY MOUTH EVERY NIGHT . MAY TAKE AN EXTRA CAPSULE ONCE DAILY AS NEEDED FOR BREAKTHROUGH PAIN, Disp: 270 capsule, Rfl: 0  TRIAMCINOLONE 0.1 % External Cream, APPLY TOPICALLY TO AFFECTED AREA(S) ON TRUNK AND EXTREMITIES TWICE DAILY AS NEEDED, Disp: 80 g, Rfl: 0  LISINOPRIL 20 MG Oral Tab, TAKE 1 TABLET BY MOUTH DAILY, Disp: 90 tablet, Rfl: 3  Glucosamine-Chondroitin 500-400 MG Oral Tab, Take 1 tablet by mouth 3 (three) times daily., Disp: , Rfl: 0  acetaminophen 500 MG Oral Tab, Take 2 tablets by mouth every 8 (eight) hours., Disp: , Rfl:  Lidocaine-Menthol (ICY HOT MAX LIDOCAINE) 4-1 % External Cream, Use as needed on feet for pain, Disp: 1 each, Rfl: 0  Multiple Vitamins-Calcium (ONE-A-DAY WOMENS FORMULA) Oral Tab, Take by mouth., Disp: , Rfl: 0  Cholecalciferol (VITAMIN D) 50 MCG (2000 UT) Oral Cap, Take by mouth., Disp: 30 capsule, Rfl: 0  Magnesium 400 MG Oral Cap, Take by mouth., Disp: , Rfl: 0    No Known Allergies    Social History  Tobacco Use  Smoking status: Never  Passive exposure: Never  Smokeless tobacco: Never  Vaping Use  Vaping status: Never Used  Alcohol use: Yes  Alcohol/week: 0.0 standard drinks of alcohol  Comment: 2 dk/mo  Drug use: No      PHYSICAL EXAMINATION: No physical exam done during today's telephone visit.    IMAGING: X-rays of the patient's right shoulder demonstrate mild AC joint arthritis and enthesopathic changes greater tuberosity.    MRI of the right shoulder from March 2025 demonstrates a massive rotator cuff tear and mild glenohumeral arthritis, grade 1-2 supraspinatus and infraspinatus muscle atrophy noted.    IMPRESSION: Traumatic complete tear of right rotator cuff, initial encounter (primary encounter diagnosis)    PLAN: The diagnosis and expected treatment course were extensively discussed with her. Due to the massive tear and significant weakness and interferes with her ADLs I recommend surgical management. I also recommend that she has been  in 4 weeks of physical therapy without significant improvement in her symptoms. I recommend a reverse shoulder arthroplasty due to her age and presence of significant muscle atrophy in the supraspinatus infraspinatus musculature. Risks, benefits, alternative surgery discussed with her. Risk discussed include bleeding, infection, stiffness, nerve damage, blood clots, pain after surgery, and need for future surgery. She understands the risks and wishes to proceed with surgery. My surgical scheduler's information was given to her and she will call to schedule surgery for next available at her convenience. CT scan for preoperative planning also ordered today and phone number for her to schedule it given to her today.    All questions answered.    Damon Roy MD.    Phone Treatment Consent    Verbal consent was obtained from the patient to conduct a telephone visit encounter in lieu of face-to-face visit. Time spent consulting and evaluating patient was 22 minutes.  Electronically signed by Damon Roy MD at 03/17/2025 12:49 PM CDT     Above copied H&P reviewed on 05/16/25, no significant changes noted.

## (undated) DEVICE — GAMMEX® PI HYBRID SIZE 9, STERILE POWDER-FREE SURGICAL GLOVE, POLYISOPRENE AND NEOPRENE BLEND: Brand: GAMMEX

## (undated) DEVICE — SPONGE LAP 18X18IN WHT COT 4 PLY FLD STRUNG

## (undated) DEVICE — SHOULDER STABILIZATION KIT,                                    DISPOSABLE 12 PER BOX

## (undated) DEVICE — BIT DRL 3MM FOR MOD GLEN SYS UNIVERS REVERS

## (undated) DEVICE — COVER,TABLE,60X90,STERILE: Brand: MEDLINE

## (undated) DEVICE — SUT MCRYL 3-0 27IN ABSRB UD L24MM PS-1

## (undated) DEVICE — ANTIBACTERIAL UNDYED BRAIDED (POLYGLACTIN 910), SYNTHETIC ABSORBABLE SUTURE: Brand: COATED VICRYL

## (undated) DEVICE — SUT COAT VCRL 0 27IN CP-1 ABSRB UD 36MM 1/2

## (undated) DEVICE — PACK CDS SHOULDER

## (undated) DEVICE — INSULATED BLADE ELECTRODE;CAUTION: FOR MANUFACTURING, PROCESSING, OR REPACKING.: Brand: EDGE

## (undated) DEVICE — SOLUTION IRRIG 3000ML 0.9% NACL FLX CONT

## (undated) DEVICE — INTENDED USE FOR SURGICAL MARKING ON INTACT SKIN, ALSO PROVIDES A PERMANENT METHOD OF IDENTIFYING OBJECTS IN THE OPERATING ROOM: Brand: WRITESITE® PLUS MINI PREP RESISTANT MARKER

## (undated) DEVICE — ZZ-CONVERTED-TO-524825-ADHESIVE SKIN TOP FOR WND CLSR DERMBND ADV

## (undated) DEVICE — TOWEL,OR,DSP,ST,BLUE,DLX,2/PK,40PK/CS: Brand: MEDLINE

## (undated) DEVICE — SET PIN MOD GLEN SYS

## (undated) DEVICE — REAM VIP GLENOID AUGMNT  SM

## (undated) DEVICE — SOLUTION IRRIG 1000ML 0.9% NACL USP BTL

## (undated) DEVICE — GOWN SUR 3XL LEV 3 BLU W/ GRN NK BND AERO BLU

## (undated) DEVICE — 3M™ IOBAN™ 2 ANTIMICROBIAL INCISE DRAPE 6651EZ: Brand: IOBAN™ 2

## (undated) DEVICE — UNIVERS REVERS HUMERAL STEM, SIZE 6
Type: IMPLANTABLE DEVICE | Site: SHOULDER | Status: NON-FUNCTIONAL
Brand: ARTHREX®
Removed: 2025-05-16

## (undated) DEVICE — SUT ETHBND XL 2 30IN V-37 NABSRB GRN 40MM 1/2

## (undated) DEVICE — DRAPE,U/ SHT,SPLIT,PLAS,STERIL: Brand: MEDLINE

## (undated) DEVICE — SHEET,DRAPE,53X77,STERILE: Brand: MEDLINE

## (undated) DEVICE — GAMMEX® PI HYBRID SIZE 7.5, STERILE POWDER-FREE SURGICAL GLOVE, POLYISOPRENE AND NEOPRENE BLEND: Brand: GAMMEX

## (undated) DEVICE — HANDPIECE SET WITH HIGH FLOW TIP AND SUCTION TUBE: Brand: INTERPULSE

## (undated) DEVICE — BLADE SAW 1.14X2.17IN THK0.025IN CUT

## (undated) DEVICE — SLING ORTH L16.5IN D7IN M DK BLU POLY COT ARM

## (undated) DEVICE — GAMMEX® PI HYBRID SIZE 7, STERILE POWDER-FREE SURGICAL GLOVE, POLYISOPRENE AND NEOPRENE BLEND: Brand: GAMMEX

## (undated) DEVICE — IMPLANTABLE DEVICE
Type: IMPLANTABLE DEVICE | Site: SHOULDER | Status: NON-FUNCTIONAL
Removed: 2025-05-16

## (undated) DEVICE — COVER,MAYO STAND,STERILE: Brand: MEDLINE

## (undated) DEVICE — BASE CALIB REUSE FOR VIP 50

## (undated) DEVICE — HOOD: Brand: FLYTE